# Patient Record
Sex: MALE | Race: WHITE
[De-identification: names, ages, dates, MRNs, and addresses within clinical notes are randomized per-mention and may not be internally consistent; named-entity substitution may affect disease eponyms.]

---

## 2017-11-08 ENCOUNTER — HOSPITAL (OUTPATIENT)
Dept: OTHER | Age: 62
End: 2017-11-08
Attending: RADIOLOGY

## 2017-12-15 ENCOUNTER — TELEPHONE (OUTPATIENT)
Dept: GASTROENTEROLOGY | Facility: CLINIC | Age: 62
End: 2017-12-15

## 2017-12-15 ENCOUNTER — OFFICE VISIT (OUTPATIENT)
Dept: GASTROENTEROLOGY | Facility: CLINIC | Age: 62
End: 2017-12-15

## 2017-12-15 VITALS
HEIGHT: 66.75 IN | HEART RATE: 71 BPM | DIASTOLIC BLOOD PRESSURE: 86 MMHG | WEIGHT: 169.88 LBS | BODY MASS INDEX: 26.66 KG/M2 | SYSTOLIC BLOOD PRESSURE: 130 MMHG

## 2017-12-15 DIAGNOSIS — Z12.11 ENCOUNTER FOR SCREENING COLONOSCOPY: Primary | ICD-10-CM

## 2017-12-15 DIAGNOSIS — Z12.11 SCREENING FOR COLON CANCER: Primary | ICD-10-CM

## 2017-12-15 PROCEDURE — 99203 OFFICE O/P NEW LOW 30 MIN: CPT | Performed by: PHYSICIAN ASSISTANT

## 2017-12-15 PROCEDURE — 99212 OFFICE O/P EST SF 10 MIN: CPT | Performed by: PHYSICIAN ASSISTANT

## 2017-12-15 RX ORDER — IBUPROFEN AND FAMOTIDINE 800; 26.6 MG/1; MG/1
TABLET, COATED ORAL
Refills: 3 | COMMUNITY
Start: 2017-10-03

## 2017-12-15 NOTE — TELEPHONE ENCOUNTER
Scheduled for:  Colonoscopy [address]3785  Provider Name: Dr. Radford Collet  Date:  12/19/17  Location:  [facility]  Sedation:  MAC  Time:  1:15 pm, arrival 12:15 pm  Prep: Suprep  Meds/Allergies Reconciled?:  Irina Piper PA-C reviewed   Diagnosis with codes:  Screening for colon c

## 2017-12-15 NOTE — PATIENT INSTRUCTIONS
1. Schedule colonoscopy with any of our GI MDs at [facility] with MAC sedation or at [facility] with IV twilight sedation. 2.  bowel prep from pharmacy - I have prescribed Suprep. This is a smaller volume preparation.

## 2017-12-15 NOTE — H&P
[address] [facility] [address] Gastroenterology                                                                                                  Clinic History and Physical     Pa Medications (Active prior to today's visit):    Current Outpatient Prescriptions:  DUEXIS 800-26.6 MG Oral Tab TAKE ONE TABLET BY MOUTH THREE TIMES A DAY AS NEEDED FOR PAIN Disp:  Rfl: 3   Na Sulfate-K Sulfate-Mg Sulf (SUPREP BOWEL PREP KIT) 17.5-3.13- labs on file. Patient presents today without GI complaints and feels otherwise well.      # Average Risk screening: patient is considered average risk for colon cancer (No family hx of colon cancer) and it is appropriate to proceed with screening colonoscop

## 2017-12-19 ENCOUNTER — ANESTHESIA (OUTPATIENT)
Dept: ENDOSCOPY | Facility: HOSPITAL | Age: 62
End: 2017-12-19
Payer: COMMERCIAL

## 2017-12-19 ENCOUNTER — ANESTHESIA EVENT (OUTPATIENT)
Dept: ENDOSCOPY | Facility: HOSPITAL | Age: 62
End: 2017-12-19
Payer: COMMERCIAL

## 2017-12-19 ENCOUNTER — SURGERY (OUTPATIENT)
Age: 62
End: 2017-12-19

## 2017-12-19 ENCOUNTER — HOSPITAL ENCOUNTER (OUTPATIENT)
Facility: HOSPITAL | Age: 62
Setting detail: HOSPITAL OUTPATIENT SURGERY
Discharge: HOME | End: 2017-12-19
Attending: INTERNAL MEDICINE | Admitting: INTERNAL MEDICINE
Payer: COMMERCIAL

## 2017-12-19 VITALS
WEIGHT: 165 LBS | BODY MASS INDEX: 25.9 KG/M2 | HEIGHT: 67 IN | RESPIRATION RATE: 11 BRPM | SYSTOLIC BLOOD PRESSURE: 119 MMHG | DIASTOLIC BLOOD PRESSURE: 73 MMHG | OXYGEN SATURATION: 97 % | HEART RATE: 56 BPM | TEMPERATURE: 97 F

## 2017-12-19 DIAGNOSIS — K63.5 COLON POLYP: ICD-10-CM

## 2017-12-19 DIAGNOSIS — K64.9 HEMORRHOIDS: Primary | ICD-10-CM

## 2017-12-19 DIAGNOSIS — Z12.11 SCREENING FOR COLON CANCER: ICD-10-CM

## 2017-12-19 PROCEDURE — 45380 COLONOSCOPY AND BIOPSY: CPT | Performed by: INTERNAL MEDICINE

## 2017-12-19 PROCEDURE — 0DBN8ZX EXCISION OF SIGMOID COLON, VIA NATURAL OR ARTIFICIAL OPENING ENDOSCOPIC, DIAGNOSTIC: ICD-10-PCS | Performed by: INTERNAL MEDICINE

## 2017-12-19 RX ORDER — SODIUM CHLORIDE, SODIUM LACTATE, POTASSIUM CHLORIDE, CALCIUM CHLORIDE 600; 310; 30; 20 MG/100ML; MG/100ML; MG/100ML; MG/100ML
INJECTION, SOLUTION INTRAVENOUS CONTINUOUS
Status: DISCONTINUED | OUTPATIENT
Start: 2017-12-19 | End: 2017-12-19

## 2017-12-19 RX ORDER — NALOXONE HYDROCHLORIDE 0.4 MG/ML
80 INJECTION, SOLUTION INTRAMUSCULAR; INTRAVENOUS; SUBCUTANEOUS AS NEEDED
Status: DISCONTINUED | OUTPATIENT
Start: 2017-12-19 | End: 2017-12-19

## 2017-12-19 RX ORDER — ONDANSETRON 2 MG/ML
4 INJECTION INTRAMUSCULAR; INTRAVENOUS ONCE AS NEEDED
Status: DISCONTINUED | OUTPATIENT
Start: 2017-12-19 | End: 2017-12-19

## 2017-12-19 RX ADMIN — SODIUM CHLORIDE, SODIUM LACTATE, POTASSIUM CHLORIDE, CALCIUM CHLORIDE: 600; 310; 30; 20 INJECTION, SOLUTION INTRAVENOUS at 13:16:00

## 2017-12-19 RX ADMIN — SODIUM CHLORIDE, SODIUM LACTATE, POTASSIUM CHLORIDE, CALCIUM CHLORIDE: 600; 310; 30; 20 INJECTION, SOLUTION INTRAVENOUS at 13:42:00

## 2017-12-19 NOTE — ANESTHESIA PREPROCEDURE EVALUATION
Anesthesia PreOp Note    HPI:     Jasmine Oppenheim is a 58year old male who presents for preoperative consultation requested by: Brunilda Slater MD    Date of Surgery: 12/19/2017    Procedure(s):  COLONOSCOPY  Indication: Screening for colon cancer [ m (5' 7\")        Anesthesia ROS/Med Hx and Physical Exam     Patient summary reviewed and Nursing notes reviewed    No history of anesthetic complications   Airway   Mallampati: II  TM distance: >3 FB  Neck ROM: full  Dental - normal exam     Pulmonary -

## 2017-12-19 NOTE — ANESTHESIA POSTPROCEDURE EVALUATION
Patient: Hailey Albrecht    Procedure Summary     Date:  12/19/17 Room / Location:  [address] [place] [address] ENDOSCOPY 01 / [address] [place] [address] ENDOSCOPY    Anesthesia Start:  9753 Anesthesia Stop:  7671    Procedure:  COLONOSCOPY (N/A ) Diagnosis:       Screening for colon cancer      (Rahul

## 2017-12-19 NOTE — H&P
History & Physical Examination    Patient Name: Darcy Lo  MRN: [id number]  CSN: [id number]  YOB: 1955    Diagnosis: colorectal cancer screening      Prescriptions Prior to Admission:  DUEXIS 800-26.6 MG Oral Tab TAKE ONE TABLET BY NICKI

## 2017-12-20 ENCOUNTER — TELEPHONE (OUTPATIENT)
Dept: GASTROENTEROLOGY | Facility: CLINIC | Age: 62
End: 2017-12-20

## 2017-12-20 NOTE — TELEPHONE ENCOUNTER
Message   Received:  Today   Message Contents   Vee Gauthier MD  P Em Gi Clinical Staff             GI staff: please place recall in for colonoscopy in 10 years      Results letter mailed to patient and colon recall entered for 10 years per MG

## 2018-06-20 ENCOUNTER — OFFICE VISIT (OUTPATIENT)
Dept: RHEUMATOLOGY | Facility: CLINIC | Age: 63
End: 2018-06-20

## 2018-06-20 VITALS
HEIGHT: 66 IN | HEART RATE: 60 BPM | WEIGHT: 171.38 LBS | DIASTOLIC BLOOD PRESSURE: 87 MMHG | SYSTOLIC BLOOD PRESSURE: 136 MMHG | BODY MASS INDEX: 27.54 KG/M2

## 2018-06-20 DIAGNOSIS — M15.9 PRIMARY OSTEOARTHRITIS INVOLVING MULTIPLE JOINTS: Primary | ICD-10-CM

## 2018-06-20 PROBLEM — M15.0 PRIMARY OSTEOARTHRITIS INVOLVING MULTIPLE JOINTS: Status: ACTIVE | Noted: 2018-06-20

## 2018-06-20 PROCEDURE — 99212 OFFICE O/P EST SF 10 MIN: CPT | Performed by: INTERNAL MEDICINE

## 2018-06-20 PROCEDURE — 99203 OFFICE O/P NEW LOW 30 MIN: CPT | Performed by: INTERNAL MEDICINE

## 2018-06-20 NOTE — PROGRESS NOTES
Micheal Momin is a 27-year-old gentleman, self-referred, for evaluation of arthritis. In college he played baseball and ruptured his Achilles tendon. It required surgery. It is done well. In 2017 he had bilateral frozen shoulders and adhesive capsulitis.   He 171 lb 6.4 oz (77.7 kg). No rash. No alopecia. No conjunctival injection. No nasal oral lesions. No cervical adenopathy. Lungs clear. S1 and S2 regular. Abdomen without hepatosplenomegaly or tenderness. Normal bowel sounds. No lower.   Neck moves

## 2019-06-24 ENCOUNTER — HOSPITAL ENCOUNTER (OUTPATIENT)
Dept: ULTRASOUND IMAGING | Facility: HOSPITAL | Age: 64
Discharge: HOME | End: 2019-06-24
Attending: PHYSICIAN ASSISTANT
Payer: COMMERCIAL

## 2019-06-24 DIAGNOSIS — I65.23 BILATERAL CAROTID ARTERY STENOSIS: ICD-10-CM

## 2019-06-24 PROCEDURE — 93880 EXTRACRANIAL BILAT STUDY: CPT | Performed by: PHYSICIAN ASSISTANT

## 2021-10-28 ENCOUNTER — TELEPHONE (OUTPATIENT)
Dept: SURGERY | Facility: CLINIC | Age: 66
End: 2021-10-28

## 2021-10-28 NOTE — TELEPHONE ENCOUNTER
Left voice message with patient that Dr. Odalis Carrasco reviewed his chart and he should be seen by Dr. Joyce Dalal. Explained to patient that his appointment with Dr. Odalis Carrasco will be canceled.

## 2021-11-16 ENCOUNTER — OFFICE VISIT (OUTPATIENT)
Dept: PHYSICAL MEDICINE AND REHAB | Facility: CLINIC | Age: 66
End: 2021-11-16
Payer: MEDICARE

## 2021-11-16 VITALS
HEIGHT: 67 IN | HEART RATE: 67 BPM | BODY MASS INDEX: 25.9 KG/M2 | SYSTOLIC BLOOD PRESSURE: 130 MMHG | WEIGHT: 165 LBS | DIASTOLIC BLOOD PRESSURE: 76 MMHG | OXYGEN SATURATION: 96 %

## 2021-11-16 DIAGNOSIS — M15.9 PRIMARY OSTEOARTHRITIS INVOLVING MULTIPLE JOINTS: Primary | ICD-10-CM

## 2021-11-16 PROCEDURE — 99204 OFFICE O/P NEW MOD 45 MIN: CPT | Performed by: PHYSICAL MEDICINE & REHABILITATION

## 2021-11-16 RX ORDER — CELECOXIB 100 MG/1
CAPSULE ORAL
Qty: 60 CAPSULE | Refills: 0 | Status: SHIPPED | OUTPATIENT
Start: 2021-11-16

## 2021-11-16 NOTE — PATIENT INSTRUCTIONS
Have the Xrays done. Take the medication twice daily with food for 2 weeks; stop if it gives you side effects, take with food.      If you are not any better after taking the medication for a week let me know and we will get you set up for thumb joint in

## 2021-11-17 ENCOUNTER — HOSPITAL ENCOUNTER (OUTPATIENT)
Dept: GENERAL RADIOLOGY | Facility: HOSPITAL | Age: 66
Discharge: HOME | End: 2021-11-17
Attending: PHYSICAL MEDICINE & REHABILITATION
Payer: MEDICARE

## 2021-11-17 ENCOUNTER — APPOINTMENT (OUTPATIENT)
Dept: GENERAL RADIOLOGY | Facility: HOSPITAL | Age: 66
End: 2021-11-17
Attending: PHYSICAL MEDICINE & REHABILITATION
Payer: MEDICARE

## 2021-11-17 ENCOUNTER — TELEPHONE (OUTPATIENT)
Dept: PHYSICAL MEDICINE AND REHAB | Facility: CLINIC | Age: 66
End: 2021-11-17

## 2021-11-17 ENCOUNTER — PATIENT MESSAGE (OUTPATIENT)
Dept: PHYSICAL MEDICINE AND REHAB | Facility: CLINIC | Age: 66
End: 2021-11-17

## 2021-11-17 DIAGNOSIS — M15.9 PRIMARY OSTEOARTHRITIS INVOLVING MULTIPLE JOINTS: ICD-10-CM

## 2021-11-17 PROCEDURE — 73130 X-RAY EXAM OF HAND: CPT | Performed by: PHYSICAL MEDICINE & REHABILITATION

## 2021-11-17 NOTE — TELEPHONE ENCOUNTER
Confirmed w/pharmacistMartin at [place]: per Dr. Albin Fenton note: \"He will start Celebrex 100 mg twice daily for the next 7 days, to be taken with food, then twice daily on an as-needed basis afterwards. \"

## 2021-11-17 NOTE — H&P
History and Physical    C/C: bilateral hand pain    HPI: 70-year-old male presents with bilateral hand pain, triggering of the middle fingers of both hands. Symptoms have been intermittent for years.   He was previously seen by Dr. Bailee Dodson, recommended n denies  Depressed Mood: denies     Physical exam:  BMI 25.84. Blood pressure 130/76. Pulse 67.   O2 sat 96%    No appreciable thumb deformities  Triggering of the right middle finger; no triggering of the left middle finger  Palpable nodules over the A1 p

## 2021-11-18 NOTE — TELEPHONE ENCOUNTER
Called [facility] state they have all the information they need to complete the refill. Called and left a message for patient that Dr. Anne Sandhoff office has given the pharmacy all the information that was was needed.

## 2021-11-18 NOTE — TELEPHONE ENCOUNTER
From: [facility]  To: Kam Roldan DO  Sent: 11/17/2021 4:36 PM CST  Subject: Could Not Get Prescription Filled    Hello,     The pharmacist at [facility] could not fill my prescription.  He needs more information regarding the dosage and how long

## 2021-11-24 ENCOUNTER — TELEPHONE (OUTPATIENT)
Dept: PHYSICAL MEDICINE AND REHAB | Facility: CLINIC | Age: 66
End: 2021-11-24

## 2021-11-24 NOTE — TELEPHONE ENCOUNTER
Left a detailed message about xray results. If the Celebrex is helping with they symptoms and if he would like to do the steroid injection Dr. Idris Madrid is suggesting.

## 2021-11-24 NOTE — TELEPHONE ENCOUNTER
----- Message from [place], [place] sent at 11/23/2021  9:37 AM CST -----    ----- Message -----  From: Gio Kellogg DO  Sent: 11/23/2021   9:33 AM CST  To: Christine Chase Nurse    XR reviewed; please let the patient know he has osteoarthritis in both hands

## 2021-12-17 ENCOUNTER — IMMUNIZATION (OUTPATIENT)
Dept: LAB | Facility: HOSPITAL | Age: 66
End: 2021-12-17
Attending: EMERGENCY MEDICINE
Payer: MEDICARE

## 2021-12-17 DIAGNOSIS — Z23 NEED FOR VACCINATION: Primary | ICD-10-CM

## 2021-12-17 PROCEDURE — 0004A SARSCOV2 VAC 30MCG/0.3ML IM: CPT

## 2022-10-27 ENCOUNTER — LAB ENCOUNTER (OUTPATIENT)
Dept: LAB | Age: 67
End: 2022-10-27
Attending: INTERNAL MEDICINE
Payer: MEDICARE

## 2022-10-27 DIAGNOSIS — Z82.49 FAMILY HISTORY OF ISCHEMIC HEART DISEASE: ICD-10-CM

## 2022-10-27 DIAGNOSIS — R06.02 SHORTNESS OF BREATH: ICD-10-CM

## 2022-10-27 DIAGNOSIS — R94.31 NONSPECIFIC ABNORMAL ELECTROCARDIOGRAM (ECG) (EKG): Primary | ICD-10-CM

## 2022-10-27 DIAGNOSIS — R09.89 ABNORMAL CHEST SOUNDS: ICD-10-CM

## 2022-10-27 LAB
ANION GAP SERPL CALC-SCNC: 8 MMOL/L (ref 0–18)
BASOPHILS # BLD AUTO: 0.08 X10(3) UL (ref 0–0.2)
BASOPHILS NFR BLD AUTO: 0.9 %
BUN BLD-MCNC: 20 MG/DL (ref 7–18)
BUN/CREAT SERPL: 19.2 (ref 10–20)
CALCIUM BLD-MCNC: 9.4 MG/DL (ref 8.5–10.1)
CHLORIDE SERPL-SCNC: 106 MMOL/L (ref 98–112)
CHOLEST SERPL-MCNC: 215 MG/DL (ref ?–200)
CO2 SERPL-SCNC: 26 MMOL/L (ref 21–32)
COMPLEXED PSA SERPL-MCNC: 5.48 NG/ML (ref ?–4)
CREAT BLD-MCNC: 1.04 MG/DL
DEPRECATED RDW RBC AUTO: 42.3 FL (ref 35.1–46.3)
EOSINOPHIL # BLD AUTO: 0.62 X10(3) UL (ref 0–0.7)
EOSINOPHIL NFR BLD AUTO: 7.3 %
ERYTHROCYTE [DISTWIDTH] IN BLOOD BY AUTOMATED COUNT: 12.7 % (ref 11–15)
EST. AVERAGE GLUCOSE BLD GHB EST-MCNC: 114 MG/DL (ref 68–126)
FASTING PATIENT LIPID ANSWER: YES
FASTING STATUS PATIENT QL REPORTED: YES
GFR SERPLBLD BASED ON 1.73 SQ M-ARVRAT: 79 ML/MIN/1.73M2 (ref 60–?)
GLUCOSE BLD-MCNC: 90 MG/DL (ref 70–99)
HBA1C MFR BLD: 5.6 % (ref ?–5.7)
HCT VFR BLD AUTO: 49 %
HDLC SERPL-MCNC: 47 MG/DL (ref 40–59)
HGB BLD-MCNC: 15.5 G/DL
IMM GRANULOCYTES # BLD AUTO: 0.02 X10(3) UL (ref 0–1)
IMM GRANULOCYTES NFR BLD: 0.2 %
LDLC SERPL CALC-MCNC: 133 MG/DL (ref ?–100)
LYMPHOCYTES # BLD AUTO: 2.9 X10(3) UL (ref 1–4)
LYMPHOCYTES NFR BLD AUTO: 34.1 %
MCH RBC QN AUTO: 28.8 PG (ref 26–34)
MCHC RBC AUTO-ENTMCNC: 31.6 G/DL (ref 31–37)
MCV RBC AUTO: 90.9 FL
MONOCYTES # BLD AUTO: 0.59 X10(3) UL (ref 0.1–1)
MONOCYTES NFR BLD AUTO: 6.9 %
NEUTROPHILS # BLD AUTO: 4.29 X10 (3) UL (ref 1.5–7.7)
NEUTROPHILS # BLD AUTO: 4.29 X10(3) UL (ref 1.5–7.7)
NEUTROPHILS NFR BLD AUTO: 50.6 %
NONHDLC SERPL-MCNC: 168 MG/DL (ref ?–130)
OSMOLALITY SERPL CALC.SUM OF ELEC: 292 MOSM/KG (ref 275–295)
PLATELET # BLD AUTO: 213 10(3)UL (ref 150–450)
POTASSIUM SERPL-SCNC: 4.3 MMOL/L (ref 3.5–5.1)
RBC # BLD AUTO: 5.39 X10(6)UL
SODIUM SERPL-SCNC: 140 MMOL/L (ref 136–145)
TRIGL SERPL-MCNC: 199 MG/DL (ref 30–149)
VLDLC SERPL CALC-MCNC: 36 MG/DL (ref 0–30)
WBC # BLD AUTO: 8.5 X10(3) UL (ref 4–11)

## 2022-10-27 PROCEDURE — 85025 COMPLETE CBC W/AUTO DIFF WBC: CPT

## 2022-10-27 PROCEDURE — 80061 LIPID PANEL: CPT

## 2022-10-27 PROCEDURE — 36415 COLL VENOUS BLD VENIPUNCTURE: CPT

## 2022-10-27 PROCEDURE — 83036 HEMOGLOBIN GLYCOSYLATED A1C: CPT

## 2022-10-27 PROCEDURE — 80048 BASIC METABOLIC PNL TOTAL CA: CPT

## 2022-11-02 ENCOUNTER — HOSPITAL ENCOUNTER (OUTPATIENT)
Dept: CT IMAGING | Facility: HOSPITAL | Age: 67
Discharge: HOME | End: 2022-11-02
Attending: INTERNAL MEDICINE

## 2022-11-02 DIAGNOSIS — Z13.6 SCREENING FOR CARDIOVASCULAR CONDITION: ICD-10-CM

## 2022-11-03 NOTE — PROGRESS NOTES
Date of Service 11/2/2022    QAMAR FERRIS  Date of Birth 6/3/1955    Patient Age: 79year old    PCP: Lachelle Moon MD  [address] [facility] [address]  [address] [address]    Consult Type  Type Scan/Screening: Heart Scan  Preliminary Heart Scan Score: 0      /82  Pt currently takes no medication          Body Mass Index  There is no height or weight on file to calculate BMI. Lipid Profile  Cholesterol: 215, done on 10/27/2022. HDL Cholesterol: 47, done on 10/27/2022. LDL Cholesterol: 133, done on 10/27/2022. TriGlycerides 199, done on 10/27/2022. Glucose 90, done on 10/27/2022  (Pt had traveled recently)      Nurse Review       Recommended Follow Up:  Consult your physician regarding[de-identified] Final Heart Scan Report; Discuss potential for Incidental Finding; Cholesterol Results (Fasting)    Free PV Screening offered to patient. Pt states has carotid ultrasound scheduled already. Recommendations for Change:  Nutrition Changes: Low Saturated Fat;Low Fat Dairy  Cholesterol Modification (goal of therapy depends upon your risk): Decrease LDL (Lousy/Bad) Ideal <100     Exercise: No Change Needed exercises regularly     Weight Management: Maintain Current Weight     Repeat Heart Scan: 5 years if Calcium Score is 0. 0; Discuss with your Physician          Reba Recommended Resources:  Recommended Resources: Upcoming Classes, Medical Services and Health Library www. [facility]. Jeferson Guzman RN        Please Contact the Nurse Heart Line with any Questions or Concerns [phone].

## 2022-11-05 PROBLEM — E78.00 HYPERCHOLESTEROLEMIA: Status: ACTIVE | Noted: 2022-11-05

## 2022-11-07 ENCOUNTER — OFFICE VISIT (OUTPATIENT)
Dept: INTERNAL MEDICINE CLINIC | Facility: CLINIC | Age: 67
End: 2022-11-07
Payer: MEDICARE

## 2022-11-07 VITALS
HEART RATE: 57 BPM | WEIGHT: 170.38 LBS | BODY MASS INDEX: 26.74 KG/M2 | HEIGHT: 67 IN | DIASTOLIC BLOOD PRESSURE: 76 MMHG | SYSTOLIC BLOOD PRESSURE: 136 MMHG

## 2022-11-07 DIAGNOSIS — R97.20 ABNORMAL PSA: ICD-10-CM

## 2022-11-07 DIAGNOSIS — Z76.89 ENCOUNTER TO ESTABLISH CARE: ICD-10-CM

## 2022-11-07 DIAGNOSIS — R93.89 ABNORMAL CT SCAN: Primary | ICD-10-CM

## 2022-11-07 RX ORDER — ROSUVASTATIN CALCIUM 10 MG/1
10 TABLET, COATED ORAL NIGHTLY
COMMUNITY

## 2022-11-17 ENCOUNTER — HOSPITAL ENCOUNTER (OUTPATIENT)
Dept: CT IMAGING | Facility: HOSPITAL | Age: 67
Discharge: HOME | End: 2022-11-17
Attending: INTERNAL MEDICINE
Payer: MEDICARE

## 2022-11-17 DIAGNOSIS — R93.89 ABNORMAL CT SCAN: ICD-10-CM

## 2022-11-17 PROBLEM — I70.0 AORTIC ATHEROSCLEROSIS (HCC): Status: ACTIVE | Noted: 2022-11-17

## 2022-11-17 PROBLEM — I70.0 AORTIC ATHEROSCLEROSIS: Status: ACTIVE | Noted: 2022-11-17

## 2022-11-17 PROCEDURE — 71250 CT THORAX DX C-: CPT | Performed by: INTERNAL MEDICINE

## 2022-12-29 ENCOUNTER — MED REC SCAN ONLY (OUTPATIENT)
Dept: FAMILY MEDICINE CLINIC | Facility: CLINIC | Age: 67
End: 2022-12-29

## 2023-03-13 ENCOUNTER — LAB ENCOUNTER (OUTPATIENT)
Dept: LAB | Age: 68
End: 2023-03-13
Attending: INTERNAL MEDICINE
Payer: MEDICARE

## 2023-03-13 DIAGNOSIS — R97.20 ABNORMAL PSA: ICD-10-CM

## 2023-03-13 DIAGNOSIS — Z82.49 FAMILY HISTORY OF ISCHEMIC HEART DISEASE: Primary | ICD-10-CM

## 2023-03-13 DIAGNOSIS — Z82.49 FAMILY HISTORY OF EARLY CAD: ICD-10-CM

## 2023-03-13 LAB
ALT SERPL-CCNC: 17 U/L
AST SERPL-CCNC: 19 U/L (ref 15–37)
CHOLEST SERPL-MCNC: 118 MG/DL (ref ?–200)
CK SERPL-CCNC: 109 U/L
FASTING PATIENT LIPID ANSWER: YES
HDLC SERPL-MCNC: 51 MG/DL (ref 40–59)
LDLC SERPL CALC-MCNC: 46 MG/DL (ref ?–100)
NONHDLC SERPL-MCNC: 67 MG/DL (ref ?–130)
PSA SERPL-MCNC: 3.91 NG/ML (ref ?–4)
TRIGL SERPL-MCNC: 118 MG/DL (ref 30–149)
VLDLC SERPL CALC-MCNC: 17 MG/DL (ref 0–30)

## 2023-03-13 PROCEDURE — 82550 ASSAY OF CK (CPK): CPT

## 2023-03-13 PROCEDURE — 84153 ASSAY OF PSA TOTAL: CPT

## 2023-03-13 PROCEDURE — 36415 COLL VENOUS BLD VENIPUNCTURE: CPT

## 2023-03-13 PROCEDURE — 84460 ALANINE AMINO (ALT) (SGPT): CPT

## 2023-03-13 PROCEDURE — 84450 TRANSFERASE (AST) (SGOT): CPT

## 2023-03-13 PROCEDURE — 80061 LIPID PANEL: CPT

## 2023-09-18 ENCOUNTER — HOSPITAL ENCOUNTER (OUTPATIENT)
Dept: CT IMAGING | Facility: HOSPITAL | Age: 68
Discharge: HOME | End: 2023-09-18
Attending: INTERNAL MEDICINE
Payer: MEDICARE

## 2023-09-18 DIAGNOSIS — R91.8 PULMONARY NODULES: ICD-10-CM

## 2023-09-18 PROCEDURE — 71250 CT THORAX DX C-: CPT | Performed by: INTERNAL MEDICINE

## 2023-09-19 ENCOUNTER — OFFICE VISIT (OUTPATIENT)
Dept: SURGERY | Facility: CLINIC | Age: 68
End: 2023-09-19

## 2023-09-19 DIAGNOSIS — R97.20 ELEVATED PSA: Primary | ICD-10-CM

## 2023-09-19 PROCEDURE — 99204 OFFICE O/P NEW MOD 45 MIN: CPT | Performed by: UROLOGY

## 2023-09-19 NOTE — PROGRESS NOTES
SUBJECTIVE:  Salma Gomez is a 76year old male who presents for a consultation at the request of, and a copy of this note will be sent to, Dr. Zac Smith, for evaluation of  elevated PSA. He states that the problem is unchanged. Symptoms include noted to have an elevated serum PSA December 2022 of 5.48. Repeat PSA March 13, 2023 3.91. He has no known family history of prostate cancer. No bone pain weight loss gross hematuria or dysuria. Denies any bothersome lower urinary tract symptoms. He has an IPSS score of 7 quality-of-life index score of 0. . He denies any other complaints. Allergies:   Penicillins             HIVES    History:  Past Medical History:   Diagnosis Date    Aortic atherosclerosis ([address]     CT scan 11-22    Hypercholesterolemia       Past Surgical History:   Procedure Laterality Date    APPENDECTOMY  1965    COLONOSCOPY N/A 12/19/2017    Procedure: COLONOSCOPY;  Surgeon: Michel Luis MD;  Location: [facility] ENDOSCOPY      Family History   Problem Relation Age of Onset    Heart Disease Father     Heart Disease Paternal Uncle     Diabetes Daughter       Social History:   Social History     Socioeconomic History    Marital status:    Tobacco Use    Smoking status: Never    Smokeless tobacco: Never   Substance and Sexual Activity    Alcohol use: Yes     Alcohol/week: 2.0 standard drinks of alcohol     Types: 2 Glasses of wine per week     Comment: Occasional glass of wine    Drug use: No            REVIEW OF SYSTEMS:  RESPIRATORY:  Negative for chest tightness, wheezing, cough, shortness of breath,  sputum production,chest pain or pleurisy. CARDIOVASCULAR:  Negative for pain or chest discomfort, dizziness or fainting, palpitations, leg swelling, nocturia, or claudication. GASTROINTESTINAL:  Negative for nausea, vomiting, diarrhea, constipation, heartburn or indigestion, abdominal pains, bloody or tarry stools.   GENERAL: Denies:  weight gain, weight loss, fever, night sweats, bone pain, malaise, and fatigue. Positive for:  none  All other review of systems reviewed and otherwise negative    OBJECTIVE:  There were no vitals taken for this visit. He appears well, in no apparent distress. Alert and oriented times three, pleasant and cooperative. CARDIOVASCULAR:normal apical impulse  RESPIRATORY: no chest wall deformities or tenderness  ABDOMEN: abdomen is soft without significant tenderness, masses, organomegaly or guarding  GENITOURINARY:      Penis: no penile lesions or discharge. Meatus normal location and size. Scrotum: normal in appearance      Right Epididymis and Vas: both are palpably normal.      Right Testis: normal        Left Epididymis and Vas: both are palpably normal.      Left Testis: normal        Inguinal Lymph Nodes: non-palpable both      Prostate: prostate smooth and symmetric without tenderness or nodules, enlarged, 50 grams       Rectal: normal tone, no masses  Skin exam grossly normal  Intact neurologically grossly    ASSESSMENT/PLAN  Elevated psa  (primary encounter diagnosis)    Orders Placed This Encounter      PSA Diagnostic [E]  Recommend:  - Return to clinic in 3 months with a repeat PSA 1 to 2 days prior to the visit. - Call if he has any problems or concerns. - If his PSA continues to be higher than normal, consider MRI of the prostate or prostate biopsy.     Meds This Visit:  Requested Prescriptions      No prescriptions requested or ordered in this encounter       Imaging & Referrals:  None

## 2023-12-15 ENCOUNTER — LAB ENCOUNTER (OUTPATIENT)
Dept: LAB | Age: 68
End: 2023-12-15
Attending: UROLOGY
Payer: MEDICARE

## 2023-12-15 DIAGNOSIS — Z82.49 FAMILY HISTORY OF ISCHEMIC HEART DISEASE: Primary | ICD-10-CM

## 2023-12-15 DIAGNOSIS — R97.20 ELEVATED PSA: ICD-10-CM

## 2023-12-15 LAB
ALT SERPL-CCNC: 14 U/L
AST SERPL-CCNC: 26 U/L (ref ?–34)
CHOLEST SERPL-MCNC: 139 MG/DL (ref ?–200)
CK SERPL-CCNC: 164 U/L
FASTING PATIENT LIPID ANSWER: YES
HDLC SERPL-MCNC: 48 MG/DL (ref 40–59)
LDLC SERPL CALC-MCNC: 69 MG/DL (ref ?–100)
NONHDLC SERPL-MCNC: 91 MG/DL (ref ?–130)
PSA SERPL-MCNC: 3.02 NG/ML (ref ?–4)
TRIGL SERPL-MCNC: 122 MG/DL (ref 30–149)
VLDLC SERPL CALC-MCNC: 19 MG/DL (ref 0–30)

## 2023-12-15 PROCEDURE — 84460 ALANINE AMINO (ALT) (SGPT): CPT

## 2023-12-15 PROCEDURE — 84450 TRANSFERASE (AST) (SGOT): CPT

## 2023-12-15 PROCEDURE — 84153 ASSAY OF PSA TOTAL: CPT

## 2023-12-15 PROCEDURE — 36415 COLL VENOUS BLD VENIPUNCTURE: CPT

## 2023-12-15 PROCEDURE — 82550 ASSAY OF CK (CPK): CPT

## 2023-12-15 PROCEDURE — 80061 LIPID PANEL: CPT

## 2023-12-20 ENCOUNTER — PATIENT MESSAGE (OUTPATIENT)
Dept: INTERNAL MEDICINE CLINIC | Facility: CLINIC | Age: 68
End: 2023-12-20

## 2023-12-21 NOTE — TELEPHONE ENCOUNTER
Critsin Olson April, RN 12/21/2023 9:38 AM CST        ----- Message -----  From: Leighton Oliva  Sent: 12/20/2023 4:47 PM CST  To: Em Triage Support  Subject: CT Scan Results to Dr. Kathryn Mckeon     Please send my latest CT scan results to Dr. Kendra Etienne. I have an appointment on 12/26. His fax number is [phone]. Thank you and Happy Holidays.

## 2024-10-21 ENCOUNTER — OFFICE VISIT (OUTPATIENT)
Dept: FAMILY MEDICINE CLINIC | Facility: CLINIC | Age: 69
End: 2024-10-21
Payer: MEDICARE

## 2024-10-21 VITALS
TEMPERATURE: 98 F | SYSTOLIC BLOOD PRESSURE: 134 MMHG | WEIGHT: 169 LBS | BODY MASS INDEX: 26.53 KG/M2 | HEIGHT: 67 IN | OXYGEN SATURATION: 96 % | HEART RATE: 78 BPM | RESPIRATION RATE: 16 BRPM | DIASTOLIC BLOOD PRESSURE: 86 MMHG

## 2024-10-21 DIAGNOSIS — R05.1 ACUTE COUGH: ICD-10-CM

## 2024-10-21 DIAGNOSIS — J01.00 ACUTE NON-RECURRENT MAXILLARY SINUSITIS: Primary | ICD-10-CM

## 2024-10-21 RX ORDER — PREDNISONE 20 MG/1
TABLET ORAL
Qty: 10 TABLET | Refills: 0 | Status: SHIPPED | OUTPATIENT
Start: 2024-10-21

## 2024-10-21 RX ORDER — DOXYCYCLINE 100 MG/1
100 CAPSULE ORAL 2 TIMES DAILY
Qty: 20 CAPSULE | Refills: 0 | Status: SHIPPED | OUTPATIENT
Start: 2024-10-21 | End: 2024-10-31

## 2024-10-21 NOTE — PROGRESS NOTES
CHIEF COMPLAINT:     Chief Complaint   Patient presents with    Cough     Sx 10 days - Runny nose, sneezing, productive cough, chills, fatigue, scratchy throat from cough, sinus pressure, PND  Denies chest congestion, ear pain/pressure, loss of appetite, nausea, vomiting, diarrhea, SOB  No Covid test was done at home  OTC Delsym  Flew from [place] last week       HPI:   Jose Acosta is a 69 year old male who presents for sinus congestion for  10  days. Symptoms have been worsening since onset. Sinus congestion/pain is described as a pressure and is located mainly in the maxillary sinuses.  Reports thick, post- nasal discharge. Has treated symptoms with otc delsym without relief.  Patient also reports headache, spasmodic cough that has worsened since onset, fullness in ears, sore throat.  Denies fever, dental pain, tinnitus, N/V/D.        Current Outpatient Medications   Medication Sig Dispense Refill    doxycycline 100 MG Oral Cap Take 1 capsule (100 mg total) by mouth 2 (two) times daily for 10 days. 20 capsule 0    predniSONE 20 MG Oral Tab TAKE 2 TABS BY MOUTH DAILY FOR 5 DAYS 10 tablet 0    rosuvastatin 10 MG Oral Tab Take 1 tablet (10 mg total) by mouth nightly.        Past Medical History:    Aortic atherosclerosis (HCC)    CT scan 11-22    Hypercholesterolemia      Past Surgical History:   Procedure Laterality Date    Appendectomy  1965    Colonoscopy N/A 12/19/2017    Procedure: COLONOSCOPY;  Surgeon: Jose Khan MD;  Location: [facility] ENDOSCOPY      Family History   Problem Relation Age of Onset    Heart Disease Father     Heart Disease Paternal Uncle     Diabetes Daughter       Social History     Socioeconomic History    Marital status:    Tobacco Use    Smoking status: Never    Smokeless tobacco: Never   Substance and Sexual Activity    Alcohol use: Yes     Alcohol/week: 2.0 standard drinks of alcohol     Types: 2 Glasses of wine per week     Comment: Occasional glass of wine    Drug use:  No         REVIEW OF SYSTEMS:   GENERAL: feels well otherwise, no unplanned weight change,  normal appetite  SKIN: no rashes or abnormal skin lesions  HEENT: See HPI.    LUNGS: denies shortness of breath or wheezing, See HPI  CARDIOVASCULAR: denies chest pain or palpitations   GI: denies N/V/C or abdominal pain  NEURO: + sinus headaches.  No numbness or tingling in face.    EXAM:   /86   Pulse 78   Temp 97.8 °F (36.6 °C) (Tympanic)   Resp 16   Ht 5' 7\" (1.702 m)   Wt 169 lb (76.7 kg)   SpO2 96%   BMI 26.47 kg/m²   GENERAL: well developed, well nourished,in no apparent distress  SKIN: no rashes,no suspicious lesions  HEAD: atraumatic, normocephalic, + tenderness on palpation of maxillary sinuses  EYES: conjunctiva clear, EOM intact  EARS: TM's pearly, no bulging, no retraction, no fluid, bony landmarks present  NOSE: nostrils patent, clear nasal mucous, nasal mucosa reddened and boggy  THROAT: oral mucosa pink, moist. No visible dental caries. Posterior pharynx is not erythematous. no exudates.  NECK: supple, non-tender  LUNGS: clear to auscultation bilaterally, no wheezes or rhonchi. Breathing is non labored.  CARDIO: RRR without murmur  EXTREMITIES: no cyanosis, clubbing or edema  LYMPH:  no lymphadenopathy.    NEURO:  No focal deficits      ASSESSMENT AND PLAN:     Encounter Diagnoses   Name Primary?    Acute non-recurrent maxillary sinusitis Yes    Acute cough        No orders of the defined types were placed in this encounter.      Meds & Refills for this Visit:  Requested Prescriptions     Signed Prescriptions Disp Refills    doxycycline 100 MG Oral Cap 20 capsule 0     Sig: Take 1 capsule (100 mg total) by mouth 2 (two) times daily for 10 days.    predniSONE 20 MG Oral Tab 10 tablet 0     Sig: TAKE 2 TABS BY MOUTH DAILY FOR 5 DAYS           Risks, benefits, side effects of medication addressed and explained.    Patient Instructions   Take prednisone-- 2 tabs once daily for 5 days. Take with food.    While you are on steroids it is preferred that you take Tylenol for pain if needed rather than ibuprofen (Motrin/Ibuprofen) or Aleve.  Take antibiotics with food and plenty of water.   Eat yogurt or take probiotic daily. (Probiotic-10 by [employer] is a good example of an OTC probiotic)  Make sure to finish the entire antibiotic treatment.  Increase fluids and rest.   Use otc meds as needed.  Monitor symptoms and contact the office if no better in 2-3 days.      The patient indicates understanding of these issues and agrees to the plan.

## 2024-10-21 NOTE — PATIENT INSTRUCTIONS
Take prednisone-- 2 tabs once daily for 5 days. Take with food.   While you are on steroids it is preferred that you take Tylenol for pain if needed rather than ibuprofen (Motrin/Ibuprofen) or Aleve.  Take antibiotics with food and plenty of water.   Eat yogurt or take probiotic daily. (Probiotic-10 by [facility] is a good example of an OTC probiotic)  Make sure to finish the entire antibiotic treatment.  Increase fluids and rest.   Use otc meds as needed.  Monitor symptoms and contact the office if no better in 2-3 days.

## 2024-12-06 ENCOUNTER — PATIENT MESSAGE (OUTPATIENT)
Dept: SURGERY | Facility: CLINIC | Age: 69
End: 2024-12-06

## 2024-12-06 DIAGNOSIS — R97.20 ELEVATED PSA: Primary | ICD-10-CM

## 2024-12-09 NOTE — TELEPHONE ENCOUNTER
LOV 9/19/24  Recommend:  - Return to clinic in 3 months with a repeat PSA 1 to 2 days prior to the visit.  - Call if he has any problems or concerns.  - If his PSA continues to be higher than normal, consider MRI of the prostate or prostate biopsy.

## 2024-12-12 ENCOUNTER — LAB ENCOUNTER (OUTPATIENT)
Dept: LAB | Age: 69
End: 2024-12-12
Attending: UROLOGY
Payer: MEDICARE

## 2024-12-12 DIAGNOSIS — Z82.49 FAMILY HISTORY OF ISCHEMIC HEART DISEASE: ICD-10-CM

## 2024-12-12 DIAGNOSIS — Z13.1 SCREENING FOR DIABETES MELLITUS: Primary | ICD-10-CM

## 2024-12-12 DIAGNOSIS — R97.20 ELEVATED PSA: ICD-10-CM

## 2024-12-12 LAB
EST. AVERAGE GLUCOSE BLD GHB EST-MCNC: 114 MG/DL (ref 68–126)
HBA1C MFR BLD: 5.6 % (ref ?–5.7)
PSA SERPL-MCNC: 4.78 NG/ML (ref ?–4)

## 2024-12-12 PROCEDURE — 84153 ASSAY OF PSA TOTAL: CPT

## 2024-12-12 PROCEDURE — 36415 COLL VENOUS BLD VENIPUNCTURE: CPT

## 2024-12-12 PROCEDURE — 82172 ASSAY OF APOLIPOPROTEIN: CPT

## 2024-12-12 PROCEDURE — 83695 ASSAY OF LIPOPROTEIN(A): CPT

## 2024-12-12 PROCEDURE — 83036 HEMOGLOBIN GLYCOSYLATED A1C: CPT

## 2024-12-13 LAB — LIPOPROTEIN (A): 16.6 NMOL/L

## 2024-12-14 LAB — APOLIPOPROTEIN B: 67 MG/DL

## 2024-12-17 ENCOUNTER — OFFICE VISIT (OUTPATIENT)
Dept: SURGERY | Facility: CLINIC | Age: 69
End: 2024-12-17

## 2024-12-17 DIAGNOSIS — R97.20 ELEVATED PSA: Primary | ICD-10-CM

## 2024-12-17 PROCEDURE — 99213 OFFICE O/P EST LOW 20 MIN: CPT | Performed by: UROLOGY

## 2024-12-17 NOTE — PROGRESS NOTES
Jose Acosta is a 69 year old male.    HPI:     Chief Complaint   Patient presents with    elevated psa     Patient is here for a follow-up.  Patient denies any issues.  His PSA was 4.78 on 12/12/24.       69-year-old male with intermittently elevated serum PSA most recently seen in the office September 19, 2023 for consultation.  Plan at that time had been to follow-up in 3 months with for repeat PSA but states he was busy as his wife had some medical issues.  Denies any change in his urination symptoms.  No bothersome lower urinary tract symptoms.  IPSS score is 6 quality-of-life index is 1.  No gross hematuria or dysuria.  Digital prostate exam performed at last visit was unremarkable aside from BPH.  PSA continues to be elevated most recently at 4.78.  He denies any bone pain or weight loss that is unexplained.   PSA Screen PSA TOTAL PSA   Latest Ref Rng <=4.00 ng/mL <=4.00 ng/mL <=4.00 ng/mL   10/27/2022 5.48 (H)      3/13/2023  3.91     12/15/2023   3.02    12/12/2024   4.78 (H)       Legend:  (H) High      HISTORY:  Past Medical History:    Aortic atherosclerosis (HCC)    CT scan 11-22    Hypercholesterolemia      Past Surgical History:   Procedure Laterality Date    Appendectomy  1965    Colonoscopy N/A 12/19/2017    Procedure: COLONOSCOPY;  Surgeon: Jose Khan MD;  Location: [facility] ENDOSCOPY      Family History   Problem Relation Age of Onset    Heart Disease Father     Heart Disease Paternal Uncle     Diabetes Daughter       Social History:   Social History     Socioeconomic History    Marital status:    Tobacco Use    Smoking status: Never    Smokeless tobacco: Never   Substance and Sexual Activity    Alcohol use: Yes     Alcohol/week: 2.0 standard drinks of alcohol     Types: 2 Glasses of wine per week     Comment: Occasional glass of wine    Drug use: No        Medications (Active prior to today's visit):  Current Outpatient Medications   Medication Sig Dispense Refill     rosuvastatin 10 MG Oral Tab Take 1 tablet (10 mg total) by mouth nightly.      predniSONE 20 MG Oral Tab TAKE 2 TABS BY MOUTH DAILY FOR 5 DAYS 10 tablet 0       Allergies:  Allergies[1]      ROS:       PHYSICAL EXAM:        ASSESSMENT/PLAN:   Assessment   Encounter Diagnosis   Name Primary?    Elevated PSA Yes       Reviewed findings at length with patient.  Recommended further evaluation with MRI of the prostate.  Further evaluation based on those results.  He tells me that he will be going to [place] for the winter and will return in early March.  He will try to obtain the MRI prior to his scheduled departure at the end of the month.         Orders This Visit:  No orders of the defined types were placed in this encounter.      Meds This Visit:  Requested Prescriptions      No prescriptions requested or ordered in this encounter       Imaging & Referrals:  MRI PROSTATE (W+WO)(CPT=72197)     12/17/2024  Fletcher Marvin MD               [1]   Allergies  Allergen Reactions    Penicillins HIVES

## 2024-12-20 ENCOUNTER — LAB ENCOUNTER (OUTPATIENT)
Dept: LAB | Age: 69
End: 2024-12-20
Attending: INTERNAL MEDICINE
Payer: MEDICARE

## 2024-12-20 DIAGNOSIS — E78.2 MIXED HYPERLIPIDEMIA: Primary | ICD-10-CM

## 2024-12-20 LAB
CHOLEST SERPL-MCNC: 133 MG/DL (ref ?–200)
FASTING PATIENT LIPID ANSWER: YES
HDLC SERPL-MCNC: 46 MG/DL (ref 40–59)
LDLC SERPL CALC-MCNC: 65 MG/DL (ref ?–100)
NONHDLC SERPL-MCNC: 87 MG/DL (ref ?–130)
TRIGL SERPL-MCNC: 123 MG/DL (ref 30–149)
VLDLC SERPL CALC-MCNC: 18 MG/DL (ref 0–30)

## 2024-12-20 PROCEDURE — 36415 COLL VENOUS BLD VENIPUNCTURE: CPT

## 2024-12-20 PROCEDURE — 80061 LIPID PANEL: CPT

## 2025-01-16 ENCOUNTER — TELEPHONE (OUTPATIENT)
Dept: SURGERY | Facility: CLINIC | Age: 70
End: 2025-01-16

## 2025-04-08 ENCOUNTER — PATIENT MESSAGE (OUTPATIENT)
Dept: SURGERY | Facility: CLINIC | Age: 70
End: 2025-04-08

## 2025-04-08 DIAGNOSIS — R97.20 ELEVATED PSA: Primary | ICD-10-CM

## 2025-04-09 RX ORDER — DIAZEPAM 10 MG/1
TABLET ORAL
Qty: 1 TABLET | Refills: 0 | Status: SHIPPED | OUTPATIENT
Start: 2025-04-09

## 2025-04-17 ENCOUNTER — HOSPITAL ENCOUNTER (OUTPATIENT)
Dept: MRI IMAGING | Facility: HOSPITAL | Age: 70
Discharge: HOME | End: 2025-04-17
Attending: UROLOGY
Payer: MEDICARE

## 2025-04-17 DIAGNOSIS — R97.20 ELEVATED PSA: ICD-10-CM

## 2025-04-17 PROCEDURE — 72197 MRI PELVIS W/O & W/DYE: CPT | Performed by: UROLOGY

## 2025-04-17 PROCEDURE — A9575 INJ GADOTERATE MEGLUMI 0.1ML: HCPCS | Performed by: UROLOGY

## 2025-04-17 RX ORDER — GADOTERATE MEGLUMINE 376.9 MG/ML
15 INJECTION INTRAVENOUS
Status: COMPLETED | OUTPATIENT
Start: 2025-04-17 | End: 2025-04-17

## 2025-04-17 RX ADMIN — GADOTERATE MEGLUMINE 15 ML: 376.9 INJECTION INTRAVENOUS at 15:22:00

## 2025-04-27 ENCOUNTER — PATIENT MESSAGE (OUTPATIENT)
Dept: SURGERY | Facility: CLINIC | Age: 70
End: 2025-04-27

## 2025-04-28 ENCOUNTER — TELEPHONE (OUTPATIENT)
Dept: SURGERY | Facility: CLINIC | Age: 70
End: 2025-04-28

## 2025-04-28 DIAGNOSIS — R97.20 ELEVATED PSA: Primary | ICD-10-CM

## 2025-04-28 NOTE — TELEPHONE ENCOUNTER
- see 4/28 results TE, [facility]

## 2025-04-28 NOTE — TELEPHONE ENCOUNTER
- TE created and [facility]    Jose العراقي  Urology Clinical Staff (supporting Fletcher Marvin MD)Yesterday (10:48 AM)     Dr. Marvin,        I'm not  sure what the prognosis is and what the next steps are.  Please let me know what I need to do .       Thanks,      Braxton Acosta

## 2025-04-29 NOTE — TELEPHONE ENCOUNTER
I called pt and gave him the results and instructions in [facility] msg as stated below and pt verbalized understanding and I gave him an appt for Thurs. 5/8 at 1:50 pm. I told pt the PSA order is in the system. He verbalized understanding and compliance.

## 2025-04-29 NOTE — TELEPHONE ENCOUNTER
I reviewed the MRI.  Radiologist does not seem to be convinced that the lesion described is suspicious even though it was assigned a PI-RADS 4 classification.  I have not seen him since December and would suggest seeing him in May for follow-up with a repeat PSA 1 to 2 days prior to the appointment which I have ordered.

## 2025-05-07 ENCOUNTER — LAB ENCOUNTER (OUTPATIENT)
Dept: LAB | Age: 70
End: 2025-05-07
Attending: UROLOGY
Payer: MEDICARE

## 2025-05-07 DIAGNOSIS — R97.20 ELEVATED PSA: ICD-10-CM

## 2025-05-07 LAB — PSA SERPL-MCNC: 5.93 NG/ML (ref ?–4)

## 2025-05-07 PROCEDURE — 36415 COLL VENOUS BLD VENIPUNCTURE: CPT

## 2025-05-07 PROCEDURE — 84153 ASSAY OF PSA TOTAL: CPT

## 2025-05-08 ENCOUNTER — HOSPITAL ENCOUNTER (OUTPATIENT)
Dept: GENERAL RADIOLOGY | Facility: HOSPITAL | Age: 70
Discharge: HOME | End: 2025-05-08
Attending: UROLOGY
Payer: MEDICARE

## 2025-05-08 ENCOUNTER — LAB ENCOUNTER (OUTPATIENT)
Dept: LAB | Facility: HOSPITAL | Age: 70
End: 2025-05-08
Attending: UROLOGY
Payer: MEDICARE

## 2025-05-08 ENCOUNTER — OFFICE VISIT (OUTPATIENT)
Dept: SURGERY | Facility: CLINIC | Age: 70
End: 2025-05-08
Payer: MEDICARE

## 2025-05-08 DIAGNOSIS — R97.20 ELEVATED PSA: Primary | ICD-10-CM

## 2025-05-08 DIAGNOSIS — M89.9 LESION OF LEFT FEMUR: ICD-10-CM

## 2025-05-08 DIAGNOSIS — R30.0 DYSURIA: ICD-10-CM

## 2025-05-08 LAB
BILIRUB UR QL: NEGATIVE
CLARITY UR: CLEAR
GLUCOSE UR-MCNC: NORMAL MG/DL
KETONES UR-MCNC: NEGATIVE MG/DL
LEUKOCYTE ESTERASE UR QL STRIP.AUTO: NEGATIVE
NITRITE UR QL STRIP.AUTO: NEGATIVE
PH UR: 5.5 [PH] (ref 5–8)
PROT UR-MCNC: NEGATIVE MG/DL
SP GR UR STRIP: 1.02 (ref 1–1.03)
UROBILINOGEN UR STRIP-ACNC: NORMAL

## 2025-05-08 PROCEDURE — 99214 OFFICE O/P EST MOD 30 MIN: CPT | Performed by: UROLOGY

## 2025-05-08 PROCEDURE — 87086 URINE CULTURE/COLONY COUNT: CPT

## 2025-05-08 PROCEDURE — 81001 URINALYSIS AUTO W/SCOPE: CPT

## 2025-05-08 PROCEDURE — 73502 X-RAY EXAM HIP UNI 2-3 VIEWS: CPT | Performed by: UROLOGY

## 2025-05-08 PROCEDURE — 73552 X-RAY EXAM OF FEMUR 2/>: CPT | Performed by: UROLOGY

## 2025-05-08 NOTE — PROGRESS NOTES
Jose Acosta is a 69 year old male.    HPI:     Chief Complaint   Patient presents with    elevated psa     MRI results         69-year-old male in follow-up to a visit December 17, 2024 with a chronically elevated serum PSA, BPH and lower urinary tract symptoms with an IPSS score today for quality-of-life index of 0.  No previous biopsies in the past.  There is no known family history of prostate cancer.  PSAs have been fluctuant but peaked previously in 2022 at 5.48.  He spends the soriano in [place] and underwent an MRI of the prostate April 17, 2025 in follow-up showing a 0.7 cm lesion in the posterior midline peripheral zone at the mid gland PI-RADS 4 possibly an extruded BPH nodule and an additional 1.8 cm lesion in the right posterior lateral peripheral zone mid gland PI-RADS 3.  There is an incidental 1.3 cm lesion in the left femoral head with recommendation made for plain radiographs.  He states about 4 5 weeks ago while he was still in [place] he had UTI symptoms.  He had his PSA checked while he was there and it was 11.  No records are available for our review.  His next year neighbor in [place] is a physician who is retired and suggested drinking a special prostate tea and starting on ivermectin.  He states he feels much better at this time.  UTI symptoms have resolved.   PSA Screen PSA TOTAL PSA   Latest Ref Rng <=4.00 ng/mL <=4.00 ng/mL <=4.00 ng/mL   10/27/2022 5.48 (H)      3/13/2023  3.91     12/15/2023   3.02    12/12/2024   4.78 (H)    5/7/2025   5.93 (H)       Legend:  (H) High    HISTORY:  Past Medical History[1]   Past Surgical History[2]   Family History[3]   Social History: Short Social Hx on File[4]     Medications (Active prior to today's visit):  Current Medications[5]    Allergies:  Allergies[6]      ROS:       PHYSICAL EXAM:   Digital prostate exam demonstrates a normal sphincter tone, prostate which is approximately 50 g smooth symmetric without masses or nodules.      ASSESSMENT/PLAN:   Assessment   Encounter Diagnoses   Name Primary?    Dysuria     Lesion of left femur     Elevated PSA Yes       Reviewed findings at length with patient.  Discussed options for management.  Will obtain a urinalysis and urine culture today to rule out UTI.  I asked him to follow-up in 3 months and see one of our providers here with a PSA prior to that appointment.  Plain radiographs of his hip and left femur will be obtained today.  He will be counseled about those results.  If his PSA remains higher than baseline at next visit he would like to need to proceed with an MRI ultrasound fusion biopsy.  I spent a total of 25 minutes with patient more than half the time in face-to-face discussion.         Orders This Visit:  Orders Placed This Encounter   Procedures    Urinalysis, Routine    Urine Culture, Routine       Meds This Visit:  Requested Prescriptions      No prescriptions requested or ordered in this encounter       Imaging & Referrals:  None     5/8/2025  Fletcher Marvin MD               [1]   Past Medical History:   Aortic atherosclerosis    CT scan 11-22    Hypercholesterolemia   [2]   Past Surgical History:  Procedure Laterality Date    Appendectomy  1965    Colonoscopy N/A 12/19/2017    Procedure: COLONOSCOPY;  Surgeon: Jose Khan MD;  Location: [facility] ENDOSCOPY   [3]   Family History  Problem Relation Age of Onset    Heart Disease Father     Heart Disease Paternal Uncle     Diabetes Daughter    [4]   Social History  Socioeconomic History    Marital status:    Tobacco Use    Smoking status: Never    Smokeless tobacco: Never   Substance and Sexual Activity    Alcohol use: Yes     Alcohol/week: 2.0 standard drinks of alcohol     Types: 2 Glasses of wine per week     Comment: Occasional glass of wine    Drug use: No   [5]   No current outpatient medications on file.   [6]   Allergies  Allergen Reactions    Penicillins HIVES

## 2025-05-09 ENCOUNTER — PATIENT MESSAGE (OUTPATIENT)
Dept: SURGERY | Facility: CLINIC | Age: 70
End: 2025-05-09

## (undated) DEVICE — FORCEP RADIAL JAW 4

## (undated) DEVICE — ENDOSCOPY PACK - LOWER: Brand: MEDLINE INDUSTRIES, INC.

## (undated) DEVICE — Device: Brand: DEFENDO AIR/WATER/SUCTION AND BIOPSY VALVE

## (undated) NOTE — LETTER
[address], [facility]   Date:   11/16/2021     Name:   Conor Solis    YOB: 1955   MRN:   [id number]       WHERE IS YOUR PAIN NOW?   James the areas on your body where you feel the desc

## (undated) NOTE — LETTER
[facility], [facility], [address]  [phone]                12/20/17      [address]        Dear Tyron Rajput,    I reviewed the pathology report f

## (undated) NOTE — LETTER
01/16/25        Jose Acosta  [address]  [id number]  [facility] [id number]      Dear Jose,    Our records indicate that you have outstanding lab work and or testing that was ordered for you and has not yet been completed:  MRI PROSTATE (W+WO)(CPT=72197)     To provide you with the best possible care, please complete these orders at your earliest convenience. If you have recently completed these orders please disregard this letter.     If you have any questions please call the office at [phone].    Thank you,       Urology Staff